# Patient Record
Sex: MALE | Race: BLACK OR AFRICAN AMERICAN | NOT HISPANIC OR LATINO | URBAN - METROPOLITAN AREA
[De-identification: names, ages, dates, MRNs, and addresses within clinical notes are randomized per-mention and may not be internally consistent; named-entity substitution may affect disease eponyms.]

---

## 2019-11-14 ENCOUNTER — EMERGENCY (EMERGENCY)
Facility: HOSPITAL | Age: 29
LOS: 1 days | Discharge: LEFT BEFORE DISCHARGE | End: 2019-11-14

## 2019-11-14 VITALS
SYSTOLIC BLOOD PRESSURE: 115 MMHG | HEIGHT: 71 IN | DIASTOLIC BLOOD PRESSURE: 79 MMHG | OXYGEN SATURATION: 97 % | TEMPERATURE: 99 F | HEART RATE: 74 BPM | RESPIRATION RATE: 17 BRPM | WEIGHT: 276.02 LBS

## 2019-11-14 NOTE — ED ADULT NURSE REASSESSMENT NOTE - NS ED NURSE REASSESS COMMENT FT1
pt says he is leaving and does not want to stay,explained to pt that we will have him seen by MD but pt states he does not want to stay

## 2019-11-14 NOTE — ED ADULT TRIAGE NOTE - CHIEF COMPLAINT QUOTE
"I am have chest discomfort and cough, with body aches. I took the Fast Acting Mucinex and twice and it gave me a blood nose. "  Pt A & O x4.

## 2021-10-21 ENCOUNTER — INPATIENT (INPATIENT)
Facility: HOSPITAL | Age: 31
LOS: 2 days | Discharge: HOME | DRG: 247 | End: 2021-10-24
Attending: FAMILY MEDICINE | Admitting: INTERNAL MEDICINE
Payer: COMMERCIAL

## 2021-10-21 VITALS
DIASTOLIC BLOOD PRESSURE: 52 MMHG | OXYGEN SATURATION: 95 % | SYSTOLIC BLOOD PRESSURE: 99 MMHG | HEIGHT: 71 IN | HEART RATE: 91 BPM | RESPIRATION RATE: 26 BRPM

## 2021-10-21 DIAGNOSIS — I21.3 ST ELEVATION (STEMI) MYOCARDIAL INFARCTION OF UNSPECIFIED SITE: ICD-10-CM

## 2021-10-21 LAB
ALBUMIN SERPL ELPH-MCNC: 4.4 G/DL — SIGNIFICANT CHANGE UP (ref 3.3–5.2)
ALP SERPL-CCNC: 56 U/L — SIGNIFICANT CHANGE UP (ref 40–120)
ALT FLD-CCNC: 41 U/L — HIGH
ANION GAP SERPL CALC-SCNC: 20 MMOL/L — HIGH (ref 5–17)
AST SERPL-CCNC: 25 U/L — SIGNIFICANT CHANGE UP
BASOPHILS # BLD AUTO: 0.05 K/UL — SIGNIFICANT CHANGE UP (ref 0–0.2)
BASOPHILS NFR BLD AUTO: 0.5 % — SIGNIFICANT CHANGE UP (ref 0–2)
BILIRUB SERPL-MCNC: 0.2 MG/DL — LOW (ref 0.4–2)
BUN SERPL-MCNC: 11.4 MG/DL — SIGNIFICANT CHANGE UP (ref 8–20)
CALCIUM SERPL-MCNC: 9.3 MG/DL — SIGNIFICANT CHANGE UP (ref 8.6–10.2)
CHLORIDE SERPL-SCNC: 102 MMOL/L — SIGNIFICANT CHANGE UP (ref 98–107)
CO2 SERPL-SCNC: 19 MMOL/L — LOW (ref 22–29)
CREAT SERPL-MCNC: 1.16 MG/DL — SIGNIFICANT CHANGE UP (ref 0.5–1.3)
EOSINOPHIL # BLD AUTO: 0.08 K/UL — SIGNIFICANT CHANGE UP (ref 0–0.5)
EOSINOPHIL NFR BLD AUTO: 0.7 % — SIGNIFICANT CHANGE UP (ref 0–6)
ETHANOL SERPL-MCNC: <10 MG/DL — SIGNIFICANT CHANGE UP (ref 0–9)
FLUAV AG NPH QL: SIGNIFICANT CHANGE UP
FLUBV AG NPH QL: SIGNIFICANT CHANGE UP
GLUCOSE SERPL-MCNC: 165 MG/DL — HIGH (ref 70–99)
HCT VFR BLD CALC: 40.5 % — SIGNIFICANT CHANGE UP (ref 39–50)
HGB BLD-MCNC: 13.5 G/DL — SIGNIFICANT CHANGE UP (ref 13–17)
IMM GRANULOCYTES NFR BLD AUTO: 1.7 % — HIGH (ref 0–1.5)
LIDOCAIN IGE QN: 27 U/L — SIGNIFICANT CHANGE UP (ref 22–51)
LYMPHOCYTES # BLD AUTO: 3.41 K/UL — HIGH (ref 1–3.3)
LYMPHOCYTES # BLD AUTO: 31.3 % — SIGNIFICANT CHANGE UP (ref 13–44)
MAGNESIUM SERPL-MCNC: 1.9 MG/DL — SIGNIFICANT CHANGE UP (ref 1.6–2.6)
MCHC RBC-ENTMCNC: 26.7 PG — LOW (ref 27–34)
MCHC RBC-ENTMCNC: 33.3 GM/DL — SIGNIFICANT CHANGE UP (ref 32–36)
MCV RBC AUTO: 80 FL — SIGNIFICANT CHANGE UP (ref 80–100)
MONOCYTES # BLD AUTO: 0.99 K/UL — HIGH (ref 0–0.9)
MONOCYTES NFR BLD AUTO: 9.1 % — SIGNIFICANT CHANGE UP (ref 2–14)
NEUTROPHILS # BLD AUTO: 6.17 K/UL — SIGNIFICANT CHANGE UP (ref 1.8–7.4)
NEUTROPHILS NFR BLD AUTO: 56.7 % — SIGNIFICANT CHANGE UP (ref 43–77)
NT-PROBNP SERPL-SCNC: 88 PG/ML — SIGNIFICANT CHANGE UP (ref 0–300)
PLATELET # BLD AUTO: 297 K/UL — SIGNIFICANT CHANGE UP (ref 150–400)
POTASSIUM SERPL-MCNC: 3.8 MMOL/L — SIGNIFICANT CHANGE UP (ref 3.5–5.3)
POTASSIUM SERPL-SCNC: 3.8 MMOL/L — SIGNIFICANT CHANGE UP (ref 3.5–5.3)
PROT SERPL-MCNC: 6.9 G/DL — SIGNIFICANT CHANGE UP (ref 6.6–8.7)
RBC # BLD: 5.06 M/UL — SIGNIFICANT CHANGE UP (ref 4.2–5.8)
RBC # FLD: 13.6 % — SIGNIFICANT CHANGE UP (ref 10.3–14.5)
RSV RNA NPH QL NAA+NON-PROBE: SIGNIFICANT CHANGE UP
SARS-COV-2 RNA SPEC QL NAA+PROBE: SIGNIFICANT CHANGE UP
SODIUM SERPL-SCNC: 140 MMOL/L — SIGNIFICANT CHANGE UP (ref 135–145)
TROPONIN T SERPL-MCNC: <0.01 NG/ML — SIGNIFICANT CHANGE UP (ref 0–0.06)
WBC # BLD: 10.88 K/UL — HIGH (ref 3.8–10.5)
WBC # FLD AUTO: 10.88 K/UL — HIGH (ref 3.8–10.5)

## 2021-10-21 PROCEDURE — 71045 X-RAY EXAM CHEST 1 VIEW: CPT | Mod: 26

## 2021-10-21 PROCEDURE — 93010 ELECTROCARDIOGRAM REPORT: CPT

## 2021-10-21 PROCEDURE — 99152 MOD SED SAME PHYS/QHP 5/>YRS: CPT | Mod: 59

## 2021-10-21 PROCEDURE — 93458 L HRT ARTERY/VENTRICLE ANGIO: CPT | Mod: 26,59

## 2021-10-21 PROCEDURE — 92978 ENDOLUMINL IVUS OCT C 1ST: CPT | Mod: 26,LD

## 2021-10-21 PROCEDURE — 99291 CRITICAL CARE FIRST HOUR: CPT

## 2021-10-21 PROCEDURE — 92941 PRQ TRLML REVSC TOT OCCL AMI: CPT | Mod: LD

## 2021-10-21 RX ORDER — SODIUM CHLORIDE 9 MG/ML
1000 INJECTION INTRAMUSCULAR; INTRAVENOUS; SUBCUTANEOUS ONCE
Refills: 0 | Status: COMPLETED | OUTPATIENT
Start: 2021-10-21 | End: 2021-10-21

## 2021-10-21 RX ORDER — ASPIRIN/CALCIUM CARB/MAGNESIUM 324 MG
324 TABLET ORAL ONCE
Refills: 0 | Status: COMPLETED | OUTPATIENT
Start: 2021-10-21 | End: 2021-10-21

## 2021-10-21 RX ADMIN — Medication 324 MILLIGRAM(S): at 23:15

## 2021-10-21 RX ADMIN — SODIUM CHLORIDE 1000 MILLILITER(S): 9 INJECTION INTRAMUSCULAR; INTRAVENOUS; SUBCUTANEOUS at 23:15
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
## 2021-10-21 NOTE — ED PROVIDER NOTE - CRITICAL CARE ATTENDING CONTRIBUTION TO CARE
I have personally provided 30 minutes of critical care time exclusive of time spent on separately billable procedures. Time includes review of laboratory data, radiology results, discussion with consultants, and monitoring for potential decompensation. Interventions were performed as documented above
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
## 2021-10-22 LAB
A1C WITH ESTIMATED AVERAGE GLUCOSE RESULT: 6.3 % — HIGH (ref 4–5.6)
ABO RH CONFIRMATION: SIGNIFICANT CHANGE UP
ALBUMIN SERPL ELPH-MCNC: 4.6 G/DL — SIGNIFICANT CHANGE UP (ref 3.3–5.2)
ALP SERPL-CCNC: 59 U/L — SIGNIFICANT CHANGE UP (ref 40–120)
ALT FLD-CCNC: 46 U/L — HIGH
AMPHET UR-MCNC: NEGATIVE — SIGNIFICANT CHANGE UP
ANION GAP SERPL CALC-SCNC: 18 MMOL/L — HIGH (ref 5–17)
APPEARANCE UR: CLEAR — SIGNIFICANT CHANGE UP
AST SERPL-CCNC: 57 U/L — HIGH
BACTERIA # UR AUTO: ABNORMAL
BARBITURATES UR SCN-MCNC: NEGATIVE — SIGNIFICANT CHANGE UP
BENZODIAZ UR-MCNC: NEGATIVE — SIGNIFICANT CHANGE UP
BILIRUB SERPL-MCNC: 0.5 MG/DL — SIGNIFICANT CHANGE UP (ref 0.4–2)
BILIRUB UR-MCNC: NEGATIVE — SIGNIFICANT CHANGE UP
BLD GP AB SCN SERPL QL: SIGNIFICANT CHANGE UP
BUN SERPL-MCNC: 9.7 MG/DL — SIGNIFICANT CHANGE UP (ref 8–20)
CALCIUM SERPL-MCNC: 8.7 MG/DL — SIGNIFICANT CHANGE UP (ref 8.6–10.2)
CHLORIDE SERPL-SCNC: 98 MMOL/L — SIGNIFICANT CHANGE UP (ref 98–107)
CHOLEST SERPL-MCNC: 181 MG/DL — SIGNIFICANT CHANGE UP
CO2 SERPL-SCNC: 22 MMOL/L — SIGNIFICANT CHANGE UP (ref 22–29)
COCAINE METAB.OTHER UR-MCNC: NEGATIVE — SIGNIFICANT CHANGE UP
COLOR SPEC: YELLOW — SIGNIFICANT CHANGE UP
CREAT SERPL-MCNC: 0.89 MG/DL — SIGNIFICANT CHANGE UP (ref 0.5–1.3)
DIFF PNL FLD: ABNORMAL
EPI CELLS # UR: SIGNIFICANT CHANGE UP
ESTIMATED AVERAGE GLUCOSE: 134 MG/DL — HIGH (ref 68–114)
GLUCOSE SERPL-MCNC: 129 MG/DL — HIGH (ref 70–99)
GLUCOSE UR QL: NEGATIVE MG/DL — SIGNIFICANT CHANGE UP
HCT VFR BLD CALC: 41.5 % — SIGNIFICANT CHANGE UP (ref 39–50)
HDLC SERPL-MCNC: 43 MG/DL — SIGNIFICANT CHANGE UP
HGB BLD-MCNC: 13.7 G/DL — SIGNIFICANT CHANGE UP (ref 13–17)
HYALINE CASTS # UR AUTO: ABNORMAL /LPF
KETONES UR-MCNC: NEGATIVE — SIGNIFICANT CHANGE UP
LEUKOCYTE ESTERASE UR-ACNC: NEGATIVE — SIGNIFICANT CHANGE UP
LIPID PNL WITH DIRECT LDL SERPL: 106 MG/DL — HIGH
MCHC RBC-ENTMCNC: 26.7 PG — LOW (ref 27–34)
MCHC RBC-ENTMCNC: 33 GM/DL — SIGNIFICANT CHANGE UP (ref 32–36)
MCV RBC AUTO: 80.9 FL — SIGNIFICANT CHANGE UP (ref 80–100)
METHADONE UR-MCNC: NEGATIVE — SIGNIFICANT CHANGE UP
NITRITE UR-MCNC: NEGATIVE — SIGNIFICANT CHANGE UP
NON HDL CHOLESTEROL: 138 MG/DL — HIGH
OPIATES UR-MCNC: NEGATIVE — SIGNIFICANT CHANGE UP
PCP SPEC-MCNC: SIGNIFICANT CHANGE UP
PCP UR-MCNC: NEGATIVE — SIGNIFICANT CHANGE UP
PH UR: 6 — SIGNIFICANT CHANGE UP (ref 5–8)
PLATELET # BLD AUTO: 274 K/UL — SIGNIFICANT CHANGE UP (ref 150–400)
POTASSIUM SERPL-MCNC: 3.9 MMOL/L — SIGNIFICANT CHANGE UP (ref 3.5–5.3)
POTASSIUM SERPL-SCNC: 3.9 MMOL/L — SIGNIFICANT CHANGE UP (ref 3.5–5.3)
PROT SERPL-MCNC: 7.1 G/DL — SIGNIFICANT CHANGE UP (ref 6.6–8.7)
PROT UR-MCNC: 30 MG/DL
RBC # BLD: 5.13 M/UL — SIGNIFICANT CHANGE UP (ref 4.2–5.8)
RBC # FLD: 13.6 % — SIGNIFICANT CHANGE UP (ref 10.3–14.5)
RBC CASTS # UR COMP ASSIST: ABNORMAL /HPF (ref 0–4)
SODIUM SERPL-SCNC: 138 MMOL/L — SIGNIFICANT CHANGE UP (ref 135–145)
SP GR SPEC: 1.02 — SIGNIFICANT CHANGE UP (ref 1.01–1.02)
THC UR QL: POSITIVE
TRIGL SERPL-MCNC: 162 MG/DL — HIGH
TROPONIN T SERPL-MCNC: 0.37 NG/ML — HIGH (ref 0–0.06)
TSH SERPL-MCNC: 1.11 UIU/ML — SIGNIFICANT CHANGE UP (ref 0.27–4.2)
UROBILINOGEN FLD QL: NEGATIVE MG/DL — SIGNIFICANT CHANGE UP
WBC # BLD: 16.92 K/UL — HIGH (ref 3.8–10.5)
WBC # FLD AUTO: 16.92 K/UL — HIGH (ref 3.8–10.5)
WBC UR QL: SIGNIFICANT CHANGE UP

## 2021-10-22 PROCEDURE — 99233 SBSQ HOSP IP/OBS HIGH 50: CPT

## 2021-10-22 PROCEDURE — 99223 1ST HOSP IP/OBS HIGH 75: CPT

## 2021-10-22 PROCEDURE — 93010 ELECTROCARDIOGRAM REPORT: CPT

## 2021-10-22 RX ORDER — TICAGRELOR 90 MG/1
90 TABLET ORAL EVERY 12 HOURS
Refills: 0 | Status: DISCONTINUED | OUTPATIENT
Start: 2021-10-22 | End: 2021-10-24

## 2021-10-22 RX ORDER — ERGOCALCIFEROL 1.25 MG/1
1 CAPSULE ORAL
Qty: 0 | Refills: 0 | DISCHARGE

## 2021-10-22 RX ORDER — CHLORHEXIDINE GLUCONATE 213 G/1000ML
1 SOLUTION TOPICAL
Refills: 0 | Status: DISCONTINUED | OUTPATIENT
Start: 2021-10-22 | End: 2021-10-24

## 2021-10-22 RX ORDER — LISINOPRIL 2.5 MG/1
2.5 TABLET ORAL DAILY
Refills: 0 | Status: DISCONTINUED | OUTPATIENT
Start: 2021-10-22 | End: 2021-10-24

## 2021-10-22 RX ORDER — METOPROLOL TARTRATE 50 MG
12.5 TABLET ORAL
Refills: 0 | Status: DISCONTINUED | OUTPATIENT
Start: 2021-10-22 | End: 2021-10-23

## 2021-10-22 RX ORDER — EPTIFIBATIDE 2 MG/ML
2 INJECTION, SOLUTION INTRAVENOUS
Qty: 75 | Refills: 0 | Status: DISCONTINUED | OUTPATIENT
Start: 2021-10-22 | End: 2021-10-22

## 2021-10-22 RX ORDER — CLOPIDOGREL BISULFATE 75 MG/1
75 TABLET, FILM COATED ORAL DAILY
Refills: 0 | Status: DISCONTINUED | OUTPATIENT
Start: 2021-10-22 | End: 2021-10-22

## 2021-10-22 RX ORDER — ASPIRIN/CALCIUM CARB/MAGNESIUM 324 MG
81 TABLET ORAL DAILY
Refills: 0 | Status: DISCONTINUED | OUTPATIENT
Start: 2021-10-22 | End: 2021-10-24

## 2021-10-22 RX ORDER — INFLUENZA VIRUS VACCINE 15; 15; 15; 15 UG/.5ML; UG/.5ML; UG/.5ML; UG/.5ML
0.5 SUSPENSION INTRAMUSCULAR ONCE
Refills: 0 | Status: DISCONTINUED | OUTPATIENT
Start: 2021-10-22 | End: 2021-10-24

## 2021-10-22 RX ORDER — LANOLIN ALCOHOL/MO/W.PET/CERES
3 CREAM (GRAM) TOPICAL AT BEDTIME
Refills: 0 | Status: DISCONTINUED | OUTPATIENT
Start: 2021-10-22 | End: 2021-10-24

## 2021-10-22 RX ORDER — ASPIRIN/CALCIUM CARB/MAGNESIUM 324 MG
325 TABLET ORAL DAILY
Refills: 0 | Status: DISCONTINUED | OUTPATIENT
Start: 2021-10-22 | End: 2021-10-22

## 2021-10-22 RX ORDER — ONDANSETRON 8 MG/1
4 TABLET, FILM COATED ORAL EVERY 6 HOURS
Refills: 0 | Status: DISCONTINUED | OUTPATIENT
Start: 2021-10-22 | End: 2021-10-24

## 2021-10-22 RX ORDER — ATORVASTATIN CALCIUM 80 MG/1
40 TABLET, FILM COATED ORAL AT BEDTIME
Refills: 0 | Status: DISCONTINUED | OUTPATIENT
Start: 2021-10-22 | End: 2021-10-24

## 2021-10-22 RX ORDER — ACETAMINOPHEN 500 MG
650 TABLET ORAL EVERY 6 HOURS
Refills: 0 | Status: DISCONTINUED | OUTPATIENT
Start: 2021-10-22 | End: 2021-10-24

## 2021-10-22 RX ORDER — ENOXAPARIN SODIUM 100 MG/ML
40 INJECTION SUBCUTANEOUS DAILY
Refills: 0 | Status: DISCONTINUED | OUTPATIENT
Start: 2021-10-22 | End: 2021-10-24

## 2021-10-22 RX ADMIN — TICAGRELOR 90 MILLIGRAM(S): 90 TABLET ORAL at 06:49

## 2021-10-22 RX ADMIN — LISINOPRIL 2.5 MILLIGRAM(S): 2.5 TABLET ORAL at 06:49

## 2021-10-22 RX ADMIN — Medication 81 MILLIGRAM(S): at 11:36

## 2021-10-22 RX ADMIN — TICAGRELOR 90 MILLIGRAM(S): 90 TABLET ORAL at 17:29

## 2021-10-22 RX ADMIN — Medication 3 MILLIGRAM(S): at 02:38

## 2021-10-22 RX ADMIN — EPTIFIBATIDE 20.3 MICROGRAM(S)/KG/MIN: 2 INJECTION, SOLUTION INTRAVENOUS at 09:47

## 2021-10-22 RX ADMIN — Medication 12.5 MILLIGRAM(S): at 17:29

## 2021-10-22 RX ADMIN — ATORVASTATIN CALCIUM 40 MILLIGRAM(S): 80 TABLET, FILM COATED ORAL at 22:46

## 2021-10-22 RX ADMIN — Medication 12.5 MILLIGRAM(S): at 06:49

## 2021-10-22 RX ADMIN — EPTIFIBATIDE 20.3 MICROGRAM(S)/KG/MIN: 2 INJECTION, SOLUTION INTRAVENOUS at 01:39

## 2021-10-22 RX ADMIN — ENOXAPARIN SODIUM 40 MILLIGRAM(S): 100 INJECTION SUBCUTANEOUS at 22:46

## 2021-10-22 RX ADMIN — CHLORHEXIDINE GLUCONATE 1 APPLICATION(S): 213 SOLUTION TOPICAL at 06:49

## 2021-10-22 NOTE — PATIENT PROFILE ADULT - NSTOBACCO QUIT READY_GEN_A_CORE_SD
GENERAL: well appearing, no acute distress   HEAD: atraumatic   EYES: EOMI, pink conjunctiva   ENT: moist oral mucosa   CARDIAC: RRR, no edema, distal pulses present   RESPIRATORY: lungs CTAB, no increased work of breathing   GASTROINTESTINAL: no abdominal tenderness, no rebound or guarding, bowel sounds presents  GENITOURINARY: no CVA tenderness   MUSCULOSKELETAL: no deformity   NEUROLOGICAL: AAOx3, CN's II-XII intact, strength 5/5 bilateral UE and LE, sensation intact to light touch, finger to nose intact, steady gait  SKIN: intact   PSYCHIATRIC: cooperative  HEME LYMPH: no lymphadenopathy
ready to quit
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
## 2021-10-22 NOTE — H&P CARDIOLOGY - EKG AND INTERPRETATION
INTERPRETATION OF TELEMETRY: Sinus no ectopy  ECG: Sinus at 85bpm w/ ST elevation Anteroseptal leads, Distal % stenosis

## 2021-10-22 NOTE — PHARMACOTHERAPY INTERVENTION NOTE - COMMENTS
drug interaction between doses of aspirin > 100 mg and ticagrelor; decreases efficacy of ticagrelor; decrease the full dose of aspirin to 81 mg
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
## 2021-10-23 LAB
ALBUMIN SERPL ELPH-MCNC: 4.2 G/DL — SIGNIFICANT CHANGE UP (ref 3.3–5.2)
ALP SERPL-CCNC: 52 U/L — SIGNIFICANT CHANGE UP (ref 40–120)
ALT FLD-CCNC: 57 U/L — HIGH
ANION GAP SERPL CALC-SCNC: 14 MMOL/L — SIGNIFICANT CHANGE UP (ref 5–17)
AST SERPL-CCNC: 158 U/L — HIGH
BASOPHILS # BLD AUTO: 0.02 K/UL — SIGNIFICANT CHANGE UP (ref 0–0.2)
BASOPHILS NFR BLD AUTO: 0.2 % — SIGNIFICANT CHANGE UP (ref 0–2)
BILIRUB SERPL-MCNC: 0.8 MG/DL — SIGNIFICANT CHANGE UP (ref 0.4–2)
BUN SERPL-MCNC: 10.3 MG/DL — SIGNIFICANT CHANGE UP (ref 8–20)
CALCIUM SERPL-MCNC: 8.5 MG/DL — LOW (ref 8.6–10.2)
CHLORIDE SERPL-SCNC: 99 MMOL/L — SIGNIFICANT CHANGE UP (ref 98–107)
CO2 SERPL-SCNC: 24 MMOL/L — SIGNIFICANT CHANGE UP (ref 22–29)
CREAT SERPL-MCNC: 1.01 MG/DL — SIGNIFICANT CHANGE UP (ref 0.5–1.3)
EOSINOPHIL # BLD AUTO: 0.05 K/UL — SIGNIFICANT CHANGE UP (ref 0–0.5)
EOSINOPHIL NFR BLD AUTO: 0.4 % — SIGNIFICANT CHANGE UP (ref 0–6)
GLUCOSE SERPL-MCNC: 150 MG/DL — HIGH (ref 70–99)
HCT VFR BLD CALC: 40.3 % — SIGNIFICANT CHANGE UP (ref 39–50)
HGB BLD-MCNC: 12.8 G/DL — LOW (ref 13–17)
IMM GRANULOCYTES NFR BLD AUTO: 0.6 % — SIGNIFICANT CHANGE UP (ref 0–1.5)
LYMPHOCYTES # BLD AUTO: 29.1 % — SIGNIFICANT CHANGE UP (ref 13–44)
LYMPHOCYTES # BLD AUTO: 3.28 K/UL — SIGNIFICANT CHANGE UP (ref 1–3.3)
MAGNESIUM SERPL-MCNC: 2 MG/DL — SIGNIFICANT CHANGE UP (ref 1.6–2.6)
MCHC RBC-ENTMCNC: 25.7 PG — LOW (ref 27–34)
MCHC RBC-ENTMCNC: 31.8 GM/DL — LOW (ref 32–36)
MCV RBC AUTO: 80.8 FL — SIGNIFICANT CHANGE UP (ref 80–100)
MONOCYTES # BLD AUTO: 1.01 K/UL — HIGH (ref 0–0.9)
MONOCYTES NFR BLD AUTO: 9 % — SIGNIFICANT CHANGE UP (ref 2–14)
NEUTROPHILS # BLD AUTO: 6.84 K/UL — SIGNIFICANT CHANGE UP (ref 1.8–7.4)
NEUTROPHILS NFR BLD AUTO: 60.7 % — SIGNIFICANT CHANGE UP (ref 43–77)
PLATELET # BLD AUTO: 255 K/UL — SIGNIFICANT CHANGE UP (ref 150–400)
POTASSIUM SERPL-MCNC: 3.6 MMOL/L — SIGNIFICANT CHANGE UP (ref 3.5–5.3)
POTASSIUM SERPL-SCNC: 3.6 MMOL/L — SIGNIFICANT CHANGE UP (ref 3.5–5.3)
PROT SERPL-MCNC: 6.2 G/DL — LOW (ref 6.6–8.7)
RBC # BLD: 4.99 M/UL — SIGNIFICANT CHANGE UP (ref 4.2–5.8)
RBC # FLD: 13.9 % — SIGNIFICANT CHANGE UP (ref 10.3–14.5)
SODIUM SERPL-SCNC: 137 MMOL/L — SIGNIFICANT CHANGE UP (ref 135–145)
WBC # BLD: 11.27 K/UL — HIGH (ref 3.8–10.5)
WBC # FLD AUTO: 11.27 K/UL — HIGH (ref 3.8–10.5)

## 2021-10-23 PROCEDURE — 93010 ELECTROCARDIOGRAM REPORT: CPT

## 2021-10-23 PROCEDURE — 99233 SBSQ HOSP IP/OBS HIGH 50: CPT

## 2021-10-23 PROCEDURE — 99232 SBSQ HOSP IP/OBS MODERATE 35: CPT

## 2021-10-23 RX ORDER — TICAGRELOR 90 MG/1
90 TABLET ORAL
Qty: 5400 | Refills: 3
Start: 2021-10-23 | End: 2022-02-19

## 2021-10-23 RX ORDER — POTASSIUM CHLORIDE 20 MEQ
40 PACKET (EA) ORAL ONCE
Refills: 0 | Status: COMPLETED | OUTPATIENT
Start: 2021-10-23 | End: 2021-10-23

## 2021-10-23 RX ORDER — METOPROLOL TARTRATE 50 MG
50 TABLET ORAL DAILY
Refills: 0 | Status: DISCONTINUED | OUTPATIENT
Start: 2021-10-23 | End: 2021-10-24

## 2021-10-23 RX ADMIN — ENOXAPARIN SODIUM 40 MILLIGRAM(S): 100 INJECTION SUBCUTANEOUS at 12:04

## 2021-10-23 RX ADMIN — Medication 1 TABLET(S): at 17:56

## 2021-10-23 RX ADMIN — Medication 40 MILLIEQUIVALENT(S): at 12:03

## 2021-10-23 RX ADMIN — TICAGRELOR 90 MILLIGRAM(S): 90 TABLET ORAL at 05:22

## 2021-10-23 RX ADMIN — TICAGRELOR 90 MILLIGRAM(S): 90 TABLET ORAL at 17:56

## 2021-10-23 RX ADMIN — LISINOPRIL 2.5 MILLIGRAM(S): 2.5 TABLET ORAL at 05:22

## 2021-10-23 RX ADMIN — Medication 81 MILLIGRAM(S): at 12:03

## 2021-10-23 RX ADMIN — ATORVASTATIN CALCIUM 40 MILLIGRAM(S): 80 TABLET, FILM COATED ORAL at 22:03

## 2021-10-23 RX ADMIN — Medication 12.5 MILLIGRAM(S): at 05:22

## 2021-10-23 RX ADMIN — Medication 50 MILLIGRAM(S): at 17:56

## 2021-10-23 NOTE — DIETITIAN INITIAL EVALUATION ADULT. - ORAL INTAKE PTA/DIET HISTORY
Pt reports good po intake at meals.  Pt states good po intake prior to admission and denies wt loss.  Pt states not following DASH/TLC meal plan at home.  Briefly discussed meal plan, however pt appeared uninterested at this time.  RD to remain available.
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
## 2021-10-23 NOTE — PROGRESS NOTE ADULT - SUBJECTIVE AND OBJECTIVE BOX
[facility]  [address] [place] [id number]    SUBJECTIVE / OVERNIGHT EVENTS:  Feeling ok no chest discomfort      ROS:  CARDIAC: No cp sob or palp  RESP: No mucus production no uri symptoms  GI:  No melana no abd pain  EXTREMITIES:  no calf tenderness no edema    TELEMETRY:      MEDICATIONS  (STANDING):  aspirin enteric coated 81 milliGRAM(s) Oral daily  atorvastatin 40 milliGRAM(s) Oral at bedtime  chlorhexidine 4% Liquid 1 Application(s) Topical <User Schedule>  enoxaparin Injectable 40 milliGRAM(s) SubCutaneous daily  influenza   Vaccine 0.5 milliLiter(s) IntraMuscular once  lisinopril 2.5 milliGRAM(s) Oral daily  metoprolol tartrate 12.5 milliGRAM(s) Oral two times a day  ticagrelor 90 milliGRAM(s) Oral every 12 hours    MEDICATIONS  (PRN):  acetaminophen     Tablet .. 650 milliGRAM(s) Oral every 6 hours PRN Temp greater or equal to 38C (100.4F), Mild Pain (1 - 3), Moderate Pain (4 - 6)  melatonin 3 milliGRAM(s) Oral at bedtime PRN Sleep  ondansetron Injectable 4 milliGRAM(s) IV Push every 6 hours PRN Nausea and/or Vomiting    Vital Signs Last 24 Hrs  T(C): 37.1 (23 Oct 2021 09:48), Max: 37.6 (23 Oct 2021 05:00)  T(F): 98.8 (23 Oct 2021 09:48), Max: 99.7 (23 Oct 2021 05:00)  HR: 80 (23 Oct 2021 09:48) (71 - 95)  BP: 133/77 (23 Oct 2021 09:48) (96/61 - 133/77)  BP(mean): 80 (22 Oct 2021 18:40) (71 - 86)  RR: 18 (23 Oct 2021 09:48) (5 - 21)  SpO2: 98% (23 Oct 2021 09:48) (97% - 100%)  I&O's Summary    22 Oct 2021 07:01  -  23 Oct 2021 07:00  --------------------------------------------------------  IN: 870 mL / OUT: 1450 mL / NET: -580 mL        PHYSICAL EXAM:  GENERAL: NAD, well-developed  EYES: EOMI, PERRLA, conjunctiva and sclera clear  NECK:  No JVD  CHEST/LUNG: CTABL ; No wheeze  HEART: RRR; No murmurs  ABDOMEN: Soft, Nontender, Nondistended; Bowel sounds present  EXTREMITIES:  2+ Peripheral Pulses, No edema right wrist with good pulse and good capillary refill  PSYCH: AAOx3  NEUROLOGY: non-focal  SKIN: No rashes or lesions. No sacral ulcer    LABS:                        12.8   11.27 )-----------( 255      ( 23 Oct 2021 07:23 )             40.3     10-    137  |  99  |  10.3  ----------------------------<  150<H>  3.6   |  24.0  |  1.01    Ca    8.5<L>      23 Oct 2021 07:23  Mg     2.0     10-    TPro  6.2<L>  /  Alb  4.2  /  TBili  0.8  /  DBili  x   /  AST  158<H>  /  ALT  57<H>  /  AlkPhos  52  10-23    CARDIAC MARKERS ( 22 Oct 2021 04:22 )  x     / 0.37 ng/mL / x     / x     / x      CARDIAC MARKERS ( 21 Oct 2021 23:18 )  x     / <0.01 ng/mL / x     / x     / x          Urinalysis Basic - ( 22 Oct 2021 00:15 )    Color: Yellow / Appearance: Clear / S.020 / pH: x  Gluc: x / Ketone: Negative  / Bili: Negative / Urobili: Negative mg/dL   Blood: x / Protein: 30 mg/dL / Nitrite: Negative   Leuk Esterase: Negative / RBC: 3-5 /HPF / WBC 0-2   Sq Epi: x / Non Sq Epi: Occasional / Bacteria: Occasional        Microbiology;        RADIOLOGY & ADDITIONAL TESTS:                     [facility]  [address] [place] [address]    SUBJECTIVE / OVERNIGHT EVENTS:  Feeling ok no chest discomfort  EKG NSR  RAD prwp ant wall with st changes evolution of AWMI  Telemetry NSR wnl    ROS:  CARDIAC: No cp sob or palp  RESP: No mucus production no uri symptoms  GI:  No melana no abd pain  EXTREMITIES:  no calf tenderness no edema    MEDICATIONS  (STANDING):  aspirin enteric coated 81 milliGRAM(s) Oral daily  atorvastatin 40 milliGRAM(s) Oral at bedtime  chlorhexidine 4% Liquid 1 Application(s) Topical <User Schedule>  enoxaparin Injectable 40 milliGRAM(s) SubCutaneous daily  influenza   Vaccine 0.5 milliLiter(s) IntraMuscular once  lisinopril 2.5 milliGRAM(s) Oral daily  metoprolol tartrate 12.5 milliGRAM(s) Oral two times a day  ticagrelor 90 milliGRAM(s) Oral every 12 hours    MEDICATIONS  (PRN):  acetaminophen     Tablet .. 650 milliGRAM(s) Oral every 6 hours PRN Temp greater or equal to 38C (100.4F), Mild Pain (1 - 3), Moderate Pain (4 - 6)  melatonin 3 milliGRAM(s) Oral at bedtime PRN Sleep  ondansetron Injectable 4 milliGRAM(s) IV Push every 6 hours PRN Nausea and/or Vomiting    Vital Signs Last 24 Hrs  T(C): 37.1 (23 Oct 2021 09:48), Max: 37.6 (23 Oct 2021 05:00)  T(F): 98.8 (23 Oct 2021 09:48), Max: 99.7 (23 Oct 2021 05:00)  HR: 80 (23 Oct 2021 09:48) (71 - 95)  BP: 133/77 (23 Oct 2021 09:48) (96/61 - 133/77)  BP(mean): 80 (22 Oct 2021 18:40) (71 - 86)  RR: 18 (23 Oct 2021 09:48) (5 - 21)  SpO2: 98% (23 Oct 2021 09:48) (97% - 100%)  I&O's Summary    22 Oct 2021 07:01  -  23 Oct 2021 07:00  --------------------------------------------------------  IN: 870 mL / OUT: 1450 mL / NET: -580 mL        PHYSICAL EXAM:  GENERAL: NAD, well-developed  EYES: EOMI, PERRLA, conjunctiva and sclera clear  NECK:  No JVD  CHEST/LUNG: CTABL ; No wheeze  HEART: RRR; No murmurs  ABDOMEN: Soft, Nontender, Nondistended; Bowel sounds present  EXTREMITIES:  2+ Peripheral Pulses, No edema right wrist with good pulse and good capillary refill  PSYCH: AAOx3  NEUROLOGY: non-focal  SKIN: No rashes or lesions. No sacral ulcer    LABS:                        12.8   11.27 )-----------( 255      ( 23 Oct 2021 07:23 )             40.3     10-23    137  |  99  |  10.3  ----------------------------<  150<H>  3.6   |  24.0  |  1.01    Ca    8.5<L>      23 Oct 2021 07:23  Mg     2.0     10-    TPro  6.2<L>  /  Alb  4.2  /  TBili  0.8  /  DBili  x   /  AST  158<H>  /  ALT  57<H>  /  AlkPhos  52  10-23    CARDIAC MARKERS ( 22 Oct 2021 04:22 )  x     / 0.37 ng/mL / x     / x     / x      CARDIAC MARKERS ( 21 Oct 2021 23:18 )  x     / <0.01 ng/mL / x     / x     / x          Urinalysis Basic - ( 22 Oct 2021 00:15 )    Color: Yellow / Appearance: Clear / S.020 / pH: x  Gluc: x / Ketone: Negative  / Bili: Negative / Urobili: Negative mg/dL   Blood: x / Protein: 30 mg/dL / Nitrite: Negative   Leuk Esterase: Negative / RBC: 3-5 /HPF / WBC 0-2   Sq Epi: x / Non Sq Epi: Occasional / Bacteria: Occasional        Microbiology;        RADIOLOGY & ADDITIONAL TESTS:
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
## 2021-10-23 NOTE — DIETITIAN INITIAL EVALUATION ADULT. - PERTINENT LABORATORY DATA
10-23 Na137 mmol/L Glu 150 mg/dL<H> K+ 3.6 mmol/L Cr  1.01 mg/dL BUN 10.3 mg/dL Phos n/a   Alb 4.2 g/dL PAB n/a

## 2021-10-23 NOTE — DIETITIAN INITIAL EVALUATION ADULT. - CALCULATED FROM (ML/KG)
Called patient to follow-up on how he was doing post operative. Patient stated he was doing well - no pain, getting his medications in. Reminded patient to wear the eye shield at bedtime and confirmed his appointment tomorrow with Dr. Almeida. Answered all questions.      
1952
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
## 2021-10-24 ENCOUNTER — TRANSCRIPTION ENCOUNTER (OUTPATIENT)
Age: 31
End: 2021-10-24

## 2021-10-24 VITALS
DIASTOLIC BLOOD PRESSURE: 58 MMHG | OXYGEN SATURATION: 90 % | HEART RATE: 72 BPM | TEMPERATURE: 98 F | SYSTOLIC BLOOD PRESSURE: 110 MMHG | RESPIRATION RATE: 14 BRPM

## 2021-10-24 LAB
ALBUMIN SERPL ELPH-MCNC: 4.1 G/DL — SIGNIFICANT CHANGE UP (ref 3.3–5.2)
ALP SERPL-CCNC: 53 U/L — SIGNIFICANT CHANGE UP (ref 40–120)
ALT FLD-CCNC: 47 U/L — HIGH
ANION GAP SERPL CALC-SCNC: 14 MMOL/L — SIGNIFICANT CHANGE UP (ref 5–17)
AST SERPL-CCNC: 71 U/L — HIGH
BASOPHILS # BLD AUTO: 0.02 K/UL — SIGNIFICANT CHANGE UP (ref 0–0.2)
BASOPHILS NFR BLD AUTO: 0.2 % — SIGNIFICANT CHANGE UP (ref 0–2)
BILIRUB SERPL-MCNC: 0.3 MG/DL — LOW (ref 0.4–2)
BUN SERPL-MCNC: 12.5 MG/DL — SIGNIFICANT CHANGE UP (ref 8–20)
CALCIUM SERPL-MCNC: 8.7 MG/DL — SIGNIFICANT CHANGE UP (ref 8.6–10.2)
CHLORIDE SERPL-SCNC: 103 MMOL/L — SIGNIFICANT CHANGE UP (ref 98–107)
CO2 SERPL-SCNC: 22 MMOL/L — SIGNIFICANT CHANGE UP (ref 22–29)
CREAT SERPL-MCNC: 1.04 MG/DL — SIGNIFICANT CHANGE UP (ref 0.5–1.3)
EOSINOPHIL # BLD AUTO: 0.1 K/UL — SIGNIFICANT CHANGE UP (ref 0–0.5)
EOSINOPHIL NFR BLD AUTO: 1 % — SIGNIFICANT CHANGE UP (ref 0–6)
GLUCOSE SERPL-MCNC: 100 MG/DL — HIGH (ref 70–99)
HCT VFR BLD CALC: 41 % — SIGNIFICANT CHANGE UP (ref 39–50)
HGB BLD-MCNC: 13.1 G/DL — SIGNIFICANT CHANGE UP (ref 13–17)
IMM GRANULOCYTES NFR BLD AUTO: 0.7 % — SIGNIFICANT CHANGE UP (ref 0–1.5)
LYMPHOCYTES # BLD AUTO: 3.36 K/UL — HIGH (ref 1–3.3)
LYMPHOCYTES # BLD AUTO: 33.9 % — SIGNIFICANT CHANGE UP (ref 13–44)
MCHC RBC-ENTMCNC: 26.3 PG — LOW (ref 27–34)
MCHC RBC-ENTMCNC: 32 GM/DL — SIGNIFICANT CHANGE UP (ref 32–36)
MCV RBC AUTO: 82.2 FL — SIGNIFICANT CHANGE UP (ref 80–100)
MONOCYTES # BLD AUTO: 1.12 K/UL — HIGH (ref 0–0.9)
MONOCYTES NFR BLD AUTO: 11.3 % — SIGNIFICANT CHANGE UP (ref 2–14)
NEUTROPHILS # BLD AUTO: 5.25 K/UL — SIGNIFICANT CHANGE UP (ref 1.8–7.4)
NEUTROPHILS NFR BLD AUTO: 52.9 % — SIGNIFICANT CHANGE UP (ref 43–77)
PLATELET # BLD AUTO: 242 K/UL — SIGNIFICANT CHANGE UP (ref 150–400)
POTASSIUM SERPL-MCNC: 4 MMOL/L — SIGNIFICANT CHANGE UP (ref 3.5–5.3)
POTASSIUM SERPL-SCNC: 4 MMOL/L — SIGNIFICANT CHANGE UP (ref 3.5–5.3)
PROT SERPL-MCNC: 6.3 G/DL — LOW (ref 6.6–8.7)
RBC # BLD: 4.99 M/UL — SIGNIFICANT CHANGE UP (ref 4.2–5.8)
RBC # FLD: 13.8 % — SIGNIFICANT CHANGE UP (ref 10.3–14.5)
SODIUM SERPL-SCNC: 139 MMOL/L — SIGNIFICANT CHANGE UP (ref 135–145)
WBC # BLD: 9.92 K/UL — SIGNIFICANT CHANGE UP (ref 3.8–10.5)
WBC # FLD AUTO: 9.92 K/UL — SIGNIFICANT CHANGE UP (ref 3.8–10.5)

## 2021-10-24 PROCEDURE — C1753: CPT

## 2021-10-24 PROCEDURE — 99239 HOSP IP/OBS DSCHRG MGMT >30: CPT

## 2021-10-24 PROCEDURE — 86850 RBC ANTIBODY SCREEN: CPT

## 2021-10-24 PROCEDURE — C1874: CPT

## 2021-10-24 PROCEDURE — 80307 DRUG TEST PRSMV CHEM ANLYZR: CPT

## 2021-10-24 PROCEDURE — 84443 ASSAY THYROID STIM HORMONE: CPT

## 2021-10-24 PROCEDURE — 71045 X-RAY EXAM CHEST 1 VIEW: CPT

## 2021-10-24 PROCEDURE — 93306 TTE W/DOPPLER COMPLETE: CPT | Mod: 26

## 2021-10-24 PROCEDURE — 83690 ASSAY OF LIPASE: CPT

## 2021-10-24 PROCEDURE — 83880 ASSAY OF NATRIURETIC PEPTIDE: CPT

## 2021-10-24 PROCEDURE — 93005 ELECTROCARDIOGRAM TRACING: CPT

## 2021-10-24 PROCEDURE — 99285 EMERGENCY DEPT VISIT HI MDM: CPT

## 2021-10-24 PROCEDURE — C9606: CPT | Mod: LD

## 2021-10-24 PROCEDURE — 93458 L HRT ARTERY/VENTRICLE ANGIO: CPT | Mod: 59

## 2021-10-24 PROCEDURE — C1725: CPT

## 2021-10-24 PROCEDURE — C1757: CPT

## 2021-10-24 PROCEDURE — C1887: CPT

## 2021-10-24 PROCEDURE — 92978 ENDOLUMINL IVUS OCT C 1ST: CPT | Mod: LD

## 2021-10-24 PROCEDURE — C1894: CPT

## 2021-10-24 PROCEDURE — 80061 LIPID PANEL: CPT

## 2021-10-24 PROCEDURE — 99232 SBSQ HOSP IP/OBS MODERATE 35: CPT

## 2021-10-24 PROCEDURE — 99153 MOD SED SAME PHYS/QHP EA: CPT

## 2021-10-24 PROCEDURE — 83036 HEMOGLOBIN GLYCOSYLATED A1C: CPT

## 2021-10-24 PROCEDURE — 86901 BLOOD TYPING SEROLOGIC RH(D): CPT

## 2021-10-24 PROCEDURE — C1769: CPT

## 2021-10-24 PROCEDURE — 86900 BLOOD TYPING SEROLOGIC ABO: CPT

## 2021-10-24 PROCEDURE — 81001 URINALYSIS AUTO W/SCOPE: CPT

## 2021-10-24 PROCEDURE — 83735 ASSAY OF MAGNESIUM: CPT

## 2021-10-24 PROCEDURE — 85027 COMPLETE CBC AUTOMATED: CPT

## 2021-10-24 PROCEDURE — 84484 ASSAY OF TROPONIN QUANT: CPT

## 2021-10-24 PROCEDURE — 93306 TTE W/DOPPLER COMPLETE: CPT

## 2021-10-24 PROCEDURE — 80053 COMPREHEN METABOLIC PANEL: CPT

## 2021-10-24 PROCEDURE — 99152 MOD SED SAME PHYS/QHP 5/>YRS: CPT

## 2021-10-24 PROCEDURE — 85025 COMPLETE CBC W/AUTO DIFF WBC: CPT

## 2021-10-24 PROCEDURE — 87637 SARSCOV2&INF A&B&RSV AMP PRB: CPT

## 2021-10-24 PROCEDURE — 36415 COLL VENOUS BLD VENIPUNCTURE: CPT

## 2021-10-24 RX ORDER — LISINOPRIL 2.5 MG/1
1 TABLET ORAL
Qty: 30 | Refills: 0
Start: 2021-10-24 | End: 2021-11-22

## 2021-10-24 RX ORDER — ASPIRIN/CALCIUM CARB/MAGNESIUM 324 MG
1 TABLET ORAL
Qty: 30 | Refills: 0
Start: 2021-10-24 | End: 2021-11-22

## 2021-10-24 RX ORDER — ATORVASTATIN CALCIUM 80 MG/1
1 TABLET, FILM COATED ORAL
Qty: 30 | Refills: 0
Start: 2021-10-24 | End: 2021-11-22

## 2021-10-24 RX ORDER — METOPROLOL TARTRATE 50 MG
1 TABLET ORAL
Qty: 30 | Refills: 0
Start: 2021-10-24 | End: 2021-11-22

## 2021-10-24 RX ORDER — TICAGRELOR 90 MG/1
1 TABLET ORAL
Qty: 60 | Refills: 0
Start: 2021-10-24 | End: 2021-11-22

## 2021-10-24 RX ADMIN — TICAGRELOR 90 MILLIGRAM(S): 90 TABLET ORAL at 17:18

## 2021-10-24 RX ADMIN — Medication 1 TABLET(S): at 11:37

## 2021-10-24 RX ADMIN — CHLORHEXIDINE GLUCONATE 1 APPLICATION(S): 213 SOLUTION TOPICAL at 05:18

## 2021-10-24 RX ADMIN — ENOXAPARIN SODIUM 40 MILLIGRAM(S): 100 INJECTION SUBCUTANEOUS at 11:36

## 2021-10-24 RX ADMIN — Medication 81 MILLIGRAM(S): at 11:36

## 2021-10-24 RX ADMIN — TICAGRELOR 90 MILLIGRAM(S): 90 TABLET ORAL at 05:08

## 2021-10-24 RX ADMIN — LISINOPRIL 2.5 MILLIGRAM(S): 2.5 TABLET ORAL at 11:37

## 2021-10-24 RX ADMIN — Medication 50 MILLIGRAM(S): at 06:57

## 2021-10-24 NOTE — DISCHARGE NOTE PROVIDER - NSDCFUADDAPPT_GEN_ALL_CORE_FT
Follow up with PMD in 1 week.  Follow up with Cardio in 1 week.     Repeat BMP in 1 week.  Thrombophilia work up as an outpatient.   Sleep study as an outpatient for suspected sleep apnea.

## 2021-10-24 NOTE — DISCHARGE NOTE PROVIDER - NSDCCPCAREPLAN_GEN_ALL_CORE_FT
PRINCIPAL DISCHARGE DIAGNOSIS  Diagnosis: STEMI (ST elevation myocardial infarction)  Assessment and Plan of Treatment: s/p cardiac cath with PCI.  Continue medications as prescribed.  Follow up with Cardio in 1 week.

## 2021-10-24 NOTE — PROGRESS NOTE ADULT - SUBJECTIVE AND OBJECTIVE BOX
[facility]                                                               Office: [address] [address]                                                              Telephone: [phone]. Fax:[phone]                                                                             PROGRESS NOTE  Reason for follow up: STEMI of the LAD   Overnight: No new events.   Update:   Patient notes that hes feeling well no lightheadedness or dizziness and wants to go home   No chest pain or shortness of breath   Subjective: "  ______________________"      	  Vitals:  T(C): 36.9 (10-24-21 @ 11:35), Max: 37.7 (10-23-21 @ 16:20)  HR: 72 (10-24-21 @ 11:35) (72 - 92)  BP: 110/58 (10-24-21 @ 11:35) (102/61 - 132/71)  RR: 14 (10-24-21 @ 11:35) (14 - 18)  SpO2: 90% (10-24-21 @ 11:35) (90% - 98%)  Wt(kg): --  I&O's Summary    Weight (kg): 129 (10-22 @ 19:32)      PHYSICAL EXAM:  Appearance: Comfortable. No acute distress  HEENT:  Head and neck: Atraumatic. Normocephalic.  Normal oral mucosa, PERRL, Neck is supple. No JVD, No carotid bruit.   Neurologic: A & O x 3, no focal deficits. EOMI.  Lymphatic: No cervical lymphadenopathy  Cardiovascular: Normal S1 S2, No murmur, rubs/gallops. No JVD, No edema  Respiratory: Lungs clear to auscultation  Gastrointestinal:  Soft, Non-tender, + BS  Lower Extremities: No edema  Psychiatry: Patient is calm. No agitation. Mood & affect appropriate  Skin: No rashes/ ecchymoses/cyanosis/ulcers visualized on the face, hands or feet.      CURRENT MEDICATIONS:  lisinopril 2.5 milliGRAM(s) Oral daily  metoprolol succinate ER 50 milliGRAM(s) Oral daily    atorvastatin  aspirin enteric coated  chlorhexidine 4% Liquid  enoxaparin Injectable  influenza   Vaccine  multivitamin  ticagrelor      DIAGNOSTIC TESTING:  [ x] Echocardiogram:   < from: TTE Echo Complete w/o Contrast w/ Doppler (10.24.21 @ 12:19) >    Summary:   1. Left ventricular ejection fraction, by visual estimation, is 60 to 65%.   2. Normal global left ventricular systolic function.   3. There is mild septal left ventricular hypertrophy.   4. Normal right ventricular size and function.   5. No evidence of mitral valve regurgitation.   6. There is no evidence of pericardial effusion.   7. There are no prior studies on this patient for comparison purposes.    Aubrie Hoff D.O. Electronically signed on 10/24/2021 at 2:04:18 PM    < end of copied text >    [ x]  Catheterization:    [ ] Stress Test:    OTHER: 	      LABS:	 	  CARDIAC MARKERS ( 22 Oct 2021 04:22 )  x     / 0.37 ng/mL / x     / x     / x      p-BNP 22 Oct 2021 04:22: x    , CARDIAC MARKERS ( 21 Oct 2021 23:18 )  x     / <0.01 ng/mL / x     / x     / x      p-BNP 21 Oct 2021 23:18: 88 pg/mL                          13.1   9.92  )-----------( 242      ( 24 Oct 2021 08:02 )             41.0     10-24    139  |  103  |  12.5  ----------------------------<  100<H>  4.0   |  22.0  |  1.04    Ca    8.7      24 Oct 2021 08:02  Mg     2.0     10-23    TPro  6.3<L>  /  Alb  4.1  /  TBili  0.3<L>  /  DBili  x   /  AST  71<H>  /  ALT  47<H>  /  AlkPhos  53  10-24    proBNP: Serum Pro-Brain Natriuretic Peptide: 88 pg/mL (10-21 @ 23:18)    Lipid Profile: Date: 10-22 @ 04:22  Total cholesterol 181; Direct LDL: --; HDL: 43; Triglycerides:162    HgA1c:   TSH: Thyroid Stimulating Hormone, Serum: 1.11 uIU/mL        TELEMETRY: Reviewed  Bradycardia   ECG:  Reviewed by me.
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
## 2021-10-24 NOTE — DISCHARGE NOTE PROVIDER - NSDCMRMEDTOKEN_GEN_ALL_CORE_FT
aspirin 81 mg oral delayed release tablet: 1 tab(s) orally once a day  atorvastatin 40 mg oral tablet: 1 tab(s) orally once a day (at bedtime)  ergocalciferol 1.25 mg (50,000 intl units) oral capsule: 1 cap(s) orally once a week  lisinopril 2.5 mg oral tablet: 1 tab(s) orally once a day  Metoprolol Succinate ER 25 mg oral tablet, extended release: 1 tab(s) orally once a day   Multiple Vitamins oral tablet: 1 tab(s) orally once a day  ticagrelor 90 mg oral tablet: 1 tab(s) orally every 12 hours
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
## 2021-10-24 NOTE — DISCHARGE NOTE NURSING/CASE MANAGEMENT/SOCIAL WORK - PATIENT PORTAL LINK FT
You can access the FollowMyHealth Patient Portal offered by [facility] by registering at the following website: [email or web address]. By joining [employer]’s FollowMyHealth portal, you will also be able to view your health information using other applications (apps) compatible with our system.

## 2021-10-25 PROBLEM — F12.90 CANNABIS USE, UNSPECIFIED, UNCOMPLICATED: Chronic | Status: ACTIVE | Noted: 2021-10-22

## 2021-11-01 ENCOUNTER — APPOINTMENT (OUTPATIENT)
Dept: CARDIOLOGY | Facility: CLINIC | Age: 31
End: 2021-11-01
Payer: MEDICAID

## 2021-11-01 ENCOUNTER — NON-APPOINTMENT (OUTPATIENT)
Age: 31
End: 2021-11-01

## 2021-11-01 VITALS — SYSTOLIC BLOOD PRESSURE: 98 MMHG | DIASTOLIC BLOOD PRESSURE: 62 MMHG

## 2021-11-01 VITALS
SYSTOLIC BLOOD PRESSURE: 98 MMHG | HEIGHT: 78 IN | TEMPERATURE: 99 F | HEART RATE: 80 BPM | DIASTOLIC BLOOD PRESSURE: 60 MMHG | OXYGEN SATURATION: 97 % | BODY MASS INDEX: 32.74 KG/M2 | WEIGHT: 283 LBS

## 2021-11-01 DIAGNOSIS — Z87.891 PERSONAL HISTORY OF NICOTINE DEPENDENCE: ICD-10-CM

## 2021-11-01 DIAGNOSIS — E55.9 VITAMIN D DEFICIENCY, UNSPECIFIED: ICD-10-CM

## 2021-11-01 DIAGNOSIS — Z78.9 OTHER SPECIFIED HEALTH STATUS: ICD-10-CM

## 2021-11-01 DIAGNOSIS — Z87.820 PERSONAL HISTORY OF TRAUMATIC BRAIN INJURY: ICD-10-CM

## 2021-11-01 DIAGNOSIS — U07.1 COVID-19: ICD-10-CM

## 2021-11-01 PROCEDURE — 93000 ELECTROCARDIOGRAM COMPLETE: CPT

## 2021-11-01 PROCEDURE — 99213 OFFICE O/P EST LOW 20 MIN: CPT

## 2021-11-07 NOTE — ASSESSMENT
[FreeTextEntry1] : 32 yo M STEMI/CAD (involving the LAD on ASA and Brilinta), Dyslipidemia presents for post hospital follow up \par \par \par 1. CAD s/p STEMI of the LAD (on ASA and Brilinta)\par - patient notes no shortness of breath which started after being on Brilinta \par - EKG reviewed and shows 11/1/2021: Sinus Rhythm @ 70 bpm Low voltage in limb leads. -Right axis for age. -Decreasing R-wave progression -may be secondary to pulmonary disease. ABNORMAL \par - on Toprol XL Lisinopril 2.5mg , ASA, Brilinta and Lipitor \par \par 2. Dyslipidemia \par - \par - on Lipitor 80mg \par \par Plan: \par - discussed transition from Brilinta to Plavix, so discussed patient to take 8 tablet of Plavix and then transition to Plavix 75 mg po qdaily \par - continue with ASA and Lipitor 80mg po qdaily \par - discussed lifestyle and dietary modification \par - patient notes that he will start exercising discussed cardio and no weight lifting at this time \par - follow up in 3 month s\par \par \par Aubrie Hoff D.O. FACC\par Cardiology\par

## 2021-11-07 NOTE — CARDIOLOGY SUMMARY
[de-identified] : 11/1/2021: Sinus  Rhythm @ 70 bpm  Low voltage in limb leads.   -Right axis for age.   -Decreasing R-wave progression -may be secondary to pulmonary disease.  ABNORMAL  [de-identified] : CORONARY VESSELS: The coronary circulation is right dominant.\par  \par LM:   --  LM: Normal.\par  \par LAD:   --  LAD: The vessel was very large sized.\par --  Proximal LAD: There was a tubular 70 % stenosis in the middle third of\par the vessel segment. The lesion was irregularly contoured and hazy. IVUS\par assessment showed ulcerated plaque with overlying thrombus.\par --  Distal LAD: There was a tubular 100 % stenosis in the distal third of\par the vessel segment. There was AIDAN grade 0 flow through the vessel (no\par flow) and a moderate-sized vascular territory distal to the lesion.\par  \par CX:   --  Circumflex: The vessel was large sized. Angiography showed no\par evidence of disease.\par  \par RCA:   --  RCA: The vessel was large sized (dominant). Angiography showed\par minor luminal irregularities with no flow limiting lesions.\par  \par COMPLICATIONS: No complications occurred during the cath lab visit.\par DIAGNOSTIC IMPRESSIONS: Anterior STEMI with proximal LAD ulcerated plaque\par and overlying thrombus causing 70% stenosis and distal % stenosis\par  \par INTERVENTIONAL IMPRESSIONS: s/p primary PCI to proximal LAD usinga 5.0 x 15\par mm stent\par s/p balloon angioplasty to distal LAD using 2.0 balloon with distal\par injection of nitroglycerin and cardene\par  \par INTERVENTIONAL RECOMMENDATIONS: continue ASA 81 mg , Ticagrelor 180 mg now\par then 90 mg twice daily\par Integrillin for 10 hours\par STatin to lower LDL < 70 mg/dl\par Admit to CCU\par Echo tomorrow to access LV function\par Cardiac rehabilitation referral\par info provided to enroll in cardiac rehab\par Cardiac rehabilitation center communicated with regarding the patient.\par  \par Prepared and signed by\par  [de-identified] : Summary:\par  1. Left ventricular ejection fraction, by visual estimation, is 60 to 65%.\par  2. Normal global left ventricular systolic function.\par  3. There is mild septal left ventricular hypertrophy.\par  4. Normal right ventricular size and function.\par  5. No evidence of mitral valve regurgitation.\par  6. There is no evidence of pericardial effusion.\par  7. There are no prior studies on this patient for comparison purposes.\par \par Aubrie Hoff D.O. Electronically signed on 10/24/2021 at 2:04:18 PM

## 2021-11-07 NOTE — HISTORY OF PRESENT ILLNESS
[FreeTextEntry1] : 32 yo M STEMI/CAD  (involving the LAD on ASA and Brilinta), Dyslipidemia presents for post hospital follow up \par \par Patient notes that he has been doing well with some associated shortness of breath but patient notes never had this prior to hospitalization but notes this occurs all day long, currently on Brilinta. \par Patient states that he occasionally has twinging chest pain but nothing like what he had when he initially presented to the hospital with his STEMI. No lower extremity edema.\par Discussed at length about diet and exercise (discussed starting with cardio rather than weight lifting). \par Complaint with medications\par Changes diet and has stopped smoking. \par \par Family Hx: Sister has CAD and need PCI \par \par \par Sister: HTN and needs PCI \par PMD: Dr. Catie hector [place]\par [email or web address]\par \par \par + shortness of breath\par CAD s/p STEMI of the LAD\par - no active chest pain or shortness of breath\par - overnight on telemetry patient was bradycardia in the 30s and even during\par echocardiogram 30 bpm\par - Toprol XL was increased due to HR in 90's and had to be decreased for reasons\par above\par - on Asa Brilinta and Lipitor\par - c/w lisinopril 2.5 q daily\par - should consider thrombophilia work up in the outpatient setting due to\par patient having acute arterial thrombosis and no known family hx of CAD (\par MTHFR/Prothombin gene mutation/ Factor V Leidin/ LAC (Lupus anticoagulant)\par - patient will require sleep study in the outpatient setting\par - Extensive counseling regarding diet taking meds arcadio dual antiplatelets\par - Low fat low cholesterol diet discussed\par - TTE reviewed and normal LV function with no evidence of valvulopathy\par \par Patient to follow up with Dr. Zarate in 1 week\par

## 2021-11-30 RX ORDER — TICAGRELOR 90 MG/1
90 TABLET ORAL
Qty: 90 | Refills: 0 | Status: DISCONTINUED | COMMUNITY
Start: 2021-11-01 | End: 2021-11-30

## 2021-12-13 ENCOUNTER — APPOINTMENT (OUTPATIENT)
Dept: CARDIOLOGY | Facility: CLINIC | Age: 31
End: 2021-12-13
Payer: MEDICAID

## 2021-12-13 VITALS
BODY MASS INDEX: 40.18 KG/M2 | DIASTOLIC BLOOD PRESSURE: 60 MMHG | SYSTOLIC BLOOD PRESSURE: 98 MMHG | TEMPERATURE: 98.3 F | HEIGHT: 71 IN | WEIGHT: 287 LBS | OXYGEN SATURATION: 96 % | HEART RATE: 83 BPM

## 2021-12-13 DIAGNOSIS — Z00.00 ENCOUNTER FOR GENERAL ADULT MEDICAL EXAMINATION W/OUT ABNORMAL FINDINGS: ICD-10-CM

## 2021-12-13 PROCEDURE — 99215 OFFICE O/P EST HI 40 MIN: CPT

## 2021-12-13 PROCEDURE — 93000 ELECTROCARDIOGRAM COMPLETE: CPT

## 2021-12-14 ENCOUNTER — NON-APPOINTMENT (OUTPATIENT)
Age: 31
End: 2021-12-14

## 2021-12-14 NOTE — REASON FOR VISIT
[Symptom and Test Evaluation] : symptom and test evaluation [Coronary Artery Disease] : coronary artery disease [FreeTextEntry1] : 30 yo M STEMI/CAD (involving the LAD on ASA and Plavix), Dyslipidemia presents for post hospital follow up \par \par Patient notes that since the last visit he has been feeling fatigue after walking 2.5 miles with associated shortness of breath, he notes that the fatigue is more noticeable and that occasionally he feels chest pain or discomfort, described as sharp and pinching in nature with no radiation of the pain, he also notes that he gets chest symptoms when picking up heavy stuff. He notes no rest pain, lower extremity edema, orthopnea or PND. \par Notes interval improvement improvement in shortness of breath after switching of Brilinta to Plavix. \par \par \par Family Hx: Sister has CAD and need PCI \par \par \par Sister: HTN and needs PCI \par PMD: Dr. Catie hector [place]\par [email or web address]\par \par

## 2021-12-14 NOTE — CARDIOLOGY SUMMARY
[de-identified] : 12/2021: Sinus  Rhythm @ 68 bpm, Low voltage in limb leads. Right axis for age.  Nonspecific T-abnormality. \par 11/1/2021: Sinus Rhythm @ 70 bpm Low voltage in limb leads. -Right axis for age. -Decreasing R-wave progression -may be secondary to pulmonary disease. ABNORMAL  [de-identified] : 10/24/2021: TTE: Summary:\par  1. Left ventricular ejection fraction, by visual estimation, is 60 to 65%.\par  2. Normal global left ventricular systolic function.\par  3. There is mild septal left ventricular hypertrophy.\par  4. Normal right ventricular size and function.\par  5. No evidence of mitral valve regurgitation.\par  6. There is no evidence of pericardial effusion.\par  7. There are no prior studies on this patient for comparison purposes.\par  [de-identified] : CORONARY VESSELS: The coronary circulation is right dominant.\par  \par LM: -- LM: Normal.\par  \par LAD: -- LAD: The vessel was very large sized.\par -- Proximal LAD: There was a tubular 70 % stenosis in the middle third of\par the vessel segment. The lesion was irregularly contoured and hazy. IVUS\par assessment showed ulcerated plaque with overlying thrombus.\par -- Distal LAD: There was a tubular 100 % stenosis in the distal third of\par the vessel segment. There was AIDAN grade 0 flow through the vessel (no\par flow) and a moderate-sized vascular territory distal to the lesion.\par  \par CX: -- Circumflex: The vessel was large sized. Angiography showed no\par evidence of disease.\par  \par RCA: -- RCA: The vessel was large sized (dominant). Angiography showed\par minor luminal irregularities with no flow limiting lesions.\par  \par COMPLICATIONS: No complications occurred during the cath lab visit.\par DIAGNOSTIC IMPRESSIONS: Anterior STEMI with proximal LAD ulcerated plaque\par and overlying thrombus causing 70% stenosis and distal % stenosis\par  \par INTERVENTIONAL IMPRESSIONS: s/p primary PCI to proximal LAD usinga 5.0 x 15\par mm stent\par s/p balloon angioplasty to distal LAD using 2.0 balloon with distal\par injection of nitroglycerin and cardene\par  \par INTERVENTIONAL RECOMMENDATIONS: continue ASA 81 mg , Ticagrelor 180 mg now\par then 90 mg twice daily\par Integrillin for 10 hours\par STatin to lower LDL < 70 mg/dl\par Admit to CCU\par Echo tomorrow to access LV function\par Cardiac rehabilitation referral\par info provided to enroll in cardiac rehab\par Cardiac rehabilitation center communicated with regarding the patient.\par

## 2021-12-14 NOTE — ASSESSMENT
[FreeTextEntry1] : 30 yo M STEMI/CAD (involving the LAD on ASA and Plavix), Dyslipidemia presents for post hospital follow up \par \par 1. CAD / STEMI (involving the LAD on ASA and Plavix)\par - notes to have fatigue and atypical chest pain \par - EKG reviewed and compared no acute changes compared to prior \par - on ASA, Plavix and Lipitor \par \par 2. Dyslipidemia \par - on Lipitor 40mg \par \par Plan: \par - discussed with patient based on fatigue and atypical chest pain symptoms will obtain stress EKG to assess for any EKG changes with ambulation and reproduction of symptoms \par - EKG reviewed and shows no changes consistent with acute ischemia, NSR @ 68 bpm with low voltage in the limb leads with right axis \par - continue with ASA, Plavix and Lipitor \par - will refer to Cardiac rehab \par - will follow up in 2 months \par \par Aubrie Hoff D.O. FACMAYANK\par Cardiology\par \par \par

## 2022-01-13 ENCOUNTER — APPOINTMENT (OUTPATIENT)
Dept: CARDIOLOGY | Facility: CLINIC | Age: 32
End: 2022-01-13

## 2022-01-20 ENCOUNTER — APPOINTMENT (OUTPATIENT)
Dept: CARDIOLOGY | Facility: CLINIC | Age: 32
End: 2022-01-20
Payer: MEDICAID

## 2022-01-20 PROCEDURE — A9500: CPT

## 2022-01-20 PROCEDURE — 93015 CV STRESS TEST SUPVJ I&R: CPT

## 2022-01-20 PROCEDURE — 78452 HT MUSCLE IMAGE SPECT MULT: CPT

## 2022-02-01 ENCOUNTER — APPOINTMENT (OUTPATIENT)
Dept: CARDIOLOGY | Facility: CLINIC | Age: 32
End: 2022-02-01

## 2022-03-08 ENCOUNTER — APPOINTMENT (OUTPATIENT)
Dept: CARDIOLOGY | Facility: CLINIC | Age: 32
End: 2022-03-08
Payer: MEDICAID

## 2022-03-08 VITALS
SYSTOLIC BLOOD PRESSURE: 100 MMHG | HEIGHT: 71 IN | OXYGEN SATURATION: 97 % | WEIGHT: 278 LBS | TEMPERATURE: 98.3 F | DIASTOLIC BLOOD PRESSURE: 60 MMHG | HEART RATE: 81 BPM | BODY MASS INDEX: 38.92 KG/M2

## 2022-03-08 PROCEDURE — 93000 ELECTROCARDIOGRAM COMPLETE: CPT

## 2022-03-08 PROCEDURE — 99214 OFFICE O/P EST MOD 30 MIN: CPT

## 2022-03-14 ENCOUNTER — NON-APPOINTMENT (OUTPATIENT)
Age: 32
End: 2022-03-14

## 2022-03-14 NOTE — REASON FOR VISIT
[Symptom and Test Evaluation] : symptom and test evaluation [FreeTextEntry1] : 30 yo M STEMI/CAD (involving the LAD on ASA and Plavix), Dyslipidemia presents for post hospital follow up \par \par Patient notes that he has  been doing better but notes that occasionally he does feel chest discomfort with some shortness of breath and notes that he has since decreased smoking cigars, he notes that the fatigue is more noticeable and that occasionally he feels chest pain or discomfort, described as sharp and pinching in nature with no radiation of the pain, he also notes that he gets chest symptoms when picking up heavy stuff. He notes no rest pain, lower extremity edema, orthopnea or PND. \par Tolerating medications although at times he forgets meds or he takes at different times \par \par Discussed his stress testing with interventionalist and notes that in the distal LAD there was difficulty in wiring and further opening and thus correlate with the stress test findings of moderate or medium sized infarct at the apex and distal wall with mild nayana-infarct ischemia. \par \par \par Family Hx: Sister has CAD and need PCI \par \par \par Sister: HTN and needs PCI \par PMD: Dr. Catie hector [place]\par [email or web address]

## 2022-03-14 NOTE — CARDIOLOGY SUMMARY
[de-identified] : 3/8/2022: Sinus  Bradycardia@ 53 bpm Low voltage in limb leads.  -Right axis for age. \par 12/2021: Sinus Rhythm @ 68 bpm, Low voltage in limb leads. Right axis for age. Nonspecific T-abnormality. \par 11/1/2021: Sinus Rhythm @ 70 bpm Low voltage in limb leads. -Right axis for age. -Decreasing R-wave progression -may be secondary to pulmonary disease. ABNORMAL \par  [de-identified] : 1/21/2022: NM Myocardial \par Abnormal pharmacologic stress test \par Medium sized infarct at the apex and distal anterior wall with mild nayana-infarct ischemia \par LVEF 59% with severe hypokinesis of the apex/distal anterior wall \par The overall study imaging quality was normal  [de-identified] : 10/24/2021: TTE: Summary:\par  1. Left ventricular ejection fraction, by visual estimation, is 60 to 65%.\par  2. Normal global left ventricular systolic function.\par  3. There is mild septal left ventricular hypertrophy.\par  4. Normal right ventricular size and function.\par  5. No evidence of mitral valve regurgitation.\par  6. There is no evidence of pericardial effusion.\par  7. There are no prior studies on this patient for comparison purposes.\par  \par  [de-identified] :  CORONARY VESSELS: The coronary circulation is right dominant.\par  LM: -- LM: Normal.\par  LAD: -- LAD: The vessel was very large sized.\par -- Proximal LAD: There was a tubular 70 % stenosis in the middle third of\par the vessel segment. The lesion was irregularly contoured and hazy. IVUS\par assessment showed ulcerated plaque with overlying thrombus.\par -- Distal LAD: There was a tubular 100 % stenosis in the distal third of\par the vessel segment. There was AIDAN grade 0 flow through the vessel (no\par flow) and a moderate-sized vascular territory distal to the lesion.\par  \par CX: -- Circumflex: The vessel was large sized. Angiography showed no\par evidence of disease.\par  \par RCA: -- RCA: The vessel was large sized (dominant). Angiography showed\par minor luminal irregularities with no flow limiting lesions.\par  \par COMPLICATIONS: No complications occurred during the cath lab visit.\par DIAGNOSTIC IMPRESSIONS: Anterior STEMI with proximal LAD ulcerated plaque\par and overlying thrombus causing 70% stenosis and distal % stenosis\par  \par INTERVENTIONAL IMPRESSIONS: s/p primary PCI to proximal LAD usinga 5.0 x 15\par mm stent\par s/p balloon angioplasty to distal LAD using 2.0 balloon with distal\par injection of nitroglycerin and cardene\par  \par INTERVENTIONAL RECOMMENDATIONS: continue ASA 81 mg , Ticagrelor 180 mg now\par then 90 mg twice daily\par Integrillin for 10 hours\par STatin to lower LDL < 70 mg/dl\par Admit to CCU\par Echo tomorrow to access LV function\par Cardiac rehabilitation referral\par info provided to enroll in cardiac rehab\par Cardiac rehabilitation center communicated with regarding the patient.\par

## 2022-03-14 NOTE — ASSESSMENT
[FreeTextEntry1] : 32 yo M STEMI/CAD (involving the LAD on ASA and Plavix), Dyslipidemia presents for post hospital follow up \par \par 1. CAD / STEMI (involving the LAD on ASA and Plavix)\par - notes to have fatigue and atypical chest pain \par - EKG reviewed and compared no acute changes compared to prior \par - on ASA, Plavix and Lipitor \par - NM Myocardial: Medium sized infarct at the apex and distal anterior wall with mild nayana-infarct ischemia \par LVEF 59% with severe hypokinesis of the apex/distal anterior wall \par - discussed finding with interventionalist and he notes in the distal LAD there was difficulty canalizing the distal LAD which supplies the apex and noted this is closed and thus correlates with the stress test and notes will have to continue with medical management  \par - referred to cardiac rehab \par - unable to maximize BB due to low normal blood pressure next visit may consider Imdur 30mg po q daily \par \par 2. Dyslipidemia \par - on Lipitor 40mg \par \par \par Aubrie Hoff D.O. FACC\par Cardiology\par

## 2022-08-06 RX ORDER — MULTIVITAMIN
TABLET ORAL
Qty: 90 | Refills: 2 | Status: ACTIVE | COMMUNITY
Start: 2021-11-01 | End: 1900-01-01

## 2022-08-16 ENCOUNTER — APPOINTMENT (OUTPATIENT)
Dept: CARDIOLOGY | Facility: CLINIC | Age: 32
End: 2022-08-16

## 2022-08-16 ENCOUNTER — NON-APPOINTMENT (OUTPATIENT)
Age: 32
End: 2022-08-16

## 2022-08-16 VITALS
DIASTOLIC BLOOD PRESSURE: 68 MMHG | BODY MASS INDEX: 37.66 KG/M2 | OXYGEN SATURATION: 98 % | SYSTOLIC BLOOD PRESSURE: 106 MMHG | HEIGHT: 71 IN | WEIGHT: 269 LBS | HEART RATE: 69 BPM | TEMPERATURE: 98.4 F

## 2022-08-16 DIAGNOSIS — R07.89 OTHER CHEST PAIN: ICD-10-CM

## 2022-08-16 DIAGNOSIS — I21.3 ST ELEVATION (STEMI) MYOCARDIAL INFARCTION OF UNSPECIFIED SITE: ICD-10-CM

## 2022-08-16 PROCEDURE — 99214 OFFICE O/P EST MOD 30 MIN: CPT | Mod: 25

## 2022-08-16 PROCEDURE — 93000 ELECTROCARDIOGRAM COMPLETE: CPT

## 2023-11-02 ENCOUNTER — NON-APPOINTMENT (OUTPATIENT)
Age: 33
End: 2023-11-02

## 2023-11-02 ENCOUNTER — APPOINTMENT (OUTPATIENT)
Dept: CARDIOLOGY | Facility: CLINIC | Age: 33
End: 2023-11-02
Payer: MEDICAID

## 2023-11-02 VITALS
WEIGHT: 290 LBS | HEIGHT: 71 IN | DIASTOLIC BLOOD PRESSURE: 52 MMHG | BODY MASS INDEX: 40.6 KG/M2 | SYSTOLIC BLOOD PRESSURE: 90 MMHG | HEART RATE: 83 BPM | OXYGEN SATURATION: 96 %

## 2023-11-02 VITALS — SYSTOLIC BLOOD PRESSURE: 94 MMHG | DIASTOLIC BLOOD PRESSURE: 60 MMHG

## 2023-11-02 DIAGNOSIS — R53.83 OTHER FATIGUE: ICD-10-CM

## 2023-11-02 PROCEDURE — 99215 OFFICE O/P EST HI 40 MIN: CPT | Mod: 25

## 2023-11-02 PROCEDURE — 93000 ELECTROCARDIOGRAM COMPLETE: CPT

## 2023-11-02 RX ORDER — ERGOCALCIFEROL 1.25 MG/1
1.25 MG CAPSULE, LIQUID FILLED ORAL
Qty: 12 | Refills: 1 | Status: DISCONTINUED | COMMUNITY
Start: 2021-11-01 | End: 2023-11-02

## 2023-11-02 RX ORDER — BLOOD PRESSURE TEST KIT-LARGE
KIT MISCELLANEOUS
Qty: 1 | Refills: 0 | Status: ACTIVE | COMMUNITY
Start: 2023-11-02 | End: 1900-01-01

## 2023-11-17 ENCOUNTER — APPOINTMENT (OUTPATIENT)
Dept: PULMONOLOGY | Facility: CLINIC | Age: 33
End: 2023-11-17
Payer: MEDICAID

## 2023-11-17 VITALS
SYSTOLIC BLOOD PRESSURE: 120 MMHG | OXYGEN SATURATION: 97 % | DIASTOLIC BLOOD PRESSURE: 72 MMHG | HEART RATE: 76 BPM | TEMPERATURE: 96.2 F | WEIGHT: 288 LBS | BODY MASS INDEX: 40.32 KG/M2 | HEIGHT: 71 IN

## 2023-11-17 DIAGNOSIS — R06.83 SNORING: ICD-10-CM

## 2023-11-17 DIAGNOSIS — G47.10 HYPERSOMNIA, UNSPECIFIED: ICD-10-CM

## 2023-11-17 DIAGNOSIS — F17.200 NICOTINE DEPENDENCE, UNSPECIFIED, UNCOMPLICATED: ICD-10-CM

## 2023-11-17 PROCEDURE — 94010 BREATHING CAPACITY TEST: CPT

## 2023-11-17 PROCEDURE — 94727 GAS DIL/WSHOT DETER LNG VOL: CPT

## 2023-11-17 PROCEDURE — 94729 DIFFUSING CAPACITY: CPT

## 2023-11-17 PROCEDURE — 99204 OFFICE O/P NEW MOD 45 MIN: CPT | Mod: 25

## 2023-12-13 ENCOUNTER — APPOINTMENT (OUTPATIENT)
Dept: PULMONOLOGY | Facility: CLINIC | Age: 33
End: 2023-12-13
Payer: MEDICAID

## 2023-12-13 VITALS
WEIGHT: 290 LBS | SYSTOLIC BLOOD PRESSURE: 122 MMHG | OXYGEN SATURATION: 97 % | HEART RATE: 86 BPM | HEIGHT: 71 IN | BODY MASS INDEX: 40.6 KG/M2 | RESPIRATION RATE: 16 BRPM | DIASTOLIC BLOOD PRESSURE: 74 MMHG

## 2023-12-13 DIAGNOSIS — E66.01 MORBID (SEVERE) OBESITY DUE TO EXCESS CALORIES: ICD-10-CM

## 2023-12-13 DIAGNOSIS — G47.21 CIRCADIAN RHYTHM SLEEP DISORDER, DELAYED SLEEP PHASE TYPE: ICD-10-CM

## 2023-12-13 PROCEDURE — 99203 OFFICE O/P NEW LOW 30 MIN: CPT

## 2023-12-13 NOTE — CONSULT LETTER
[Dear  ___] : Dear  [unfilled], [Consult Letter:] : I had the pleasure of evaluating your patient, [unfilled]. [Please see my note below.] : Please see my note below. [Consult Closing:] : Thank you very much for allowing me to participate in the care of this patient.  If you have any questions, please do not hesitate to contact me. [Sincerely,] : Sincerely, [DrAlysha  ___] : Dr. DELGADILLO

## 2023-12-13 NOTE — HISTORY OF PRESENT ILLNESS
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   Smoking Status: current   eCigarettes: never   Marijuana use: everyday, current   Other: Patient smokes 3-4 cigarettes a week  Can't sleep well at home - worries about two young sons Difficulty falling asleep at night - works nights.   Supposed to have been seen at the [facility] by Dr. Hope on 12/5/23, in order to be scheduled for a PSG at their lab but he missed that appointment.  I think he may have believed his appointment here was for that purpose.  [ESS] : 8

## 2023-12-13 NOTE — RESULTS/DATA
[TextEntry] : Pulmonary function test 11/17/2023 revealed mild obstructive airways disease. Severe obstruction small airways

## 2023-12-13 NOTE — DISCUSSION/SUMMARY
[FreeTextEntry1] : Assess  Obesity KANIKA  Plan  Weight loss PSG at [facility] (after rescheduling with Dr Hope) or at [facility] on my order depending on patient  preference.  6 week FU with Dr Pate

## 2023-12-13 NOTE — PHYSICAL EXAM
[No Acute Distress] : no acute distress [Elongated Uvula] : elongated uvula [Enlarged Base of the Tongue] : enlarged base of the tongue [III] : Mallampati Class: III [Normal Appearance] : normal appearance [Neck Circumference: ___] : neck circumference: [unfilled] [No Neck Mass] : no neck mass [Normal Rate/Rhythm] : normal rate/rhythm [Normal S1, S2] : normal s1, s2 [No Resp Distress] : no resp distress [Clear to Auscultation Bilaterally] : clear to auscultation bilaterally [No Clubbing] : no clubbing [No Edema] : no edema

## 2023-12-26 ENCOUNTER — APPOINTMENT (OUTPATIENT)
Dept: PULMONOLOGY | Facility: CLINIC | Age: 33
End: 2023-12-26

## 2024-02-11 ENCOUNTER — OUTPATIENT (OUTPATIENT)
Dept: OUTPATIENT SERVICES | Facility: HOSPITAL | Age: 34
LOS: 1 days | End: 2024-02-11
Payer: COMMERCIAL

## 2024-02-11 DIAGNOSIS — G47.33 OBSTRUCTIVE SLEEP APNEA (ADULT) (PEDIATRIC): ICD-10-CM

## 2024-02-11 PROCEDURE — 95810 POLYSOM 6/> YRS 4/> PARAM: CPT | Mod: 26

## 2024-02-11 PROCEDURE — 95811 POLYSOM 6/>YRS CPAP 4/> PARM: CPT

## 2024-03-12 ENCOUNTER — APPOINTMENT (OUTPATIENT)
Dept: CARDIOLOGY | Facility: CLINIC | Age: 34
End: 2024-03-12

## 2024-04-24 ENCOUNTER — RX RENEWAL (OUTPATIENT)
Age: 34
End: 2024-04-24

## 2024-05-10 ENCOUNTER — APPOINTMENT (OUTPATIENT)
Dept: CARDIOLOGY | Facility: CLINIC | Age: 34
End: 2024-05-10
Payer: MEDICAID

## 2024-05-10 ENCOUNTER — NON-APPOINTMENT (OUTPATIENT)
Age: 34
End: 2024-05-10

## 2024-05-10 VITALS
HEART RATE: 71 BPM | HEIGHT: 71 IN | SYSTOLIC BLOOD PRESSURE: 132 MMHG | OXYGEN SATURATION: 95 % | DIASTOLIC BLOOD PRESSURE: 78 MMHG | BODY MASS INDEX: 39.2 KG/M2 | WEIGHT: 280 LBS

## 2024-05-10 DIAGNOSIS — G47.33 OBSTRUCTIVE SLEEP APNEA (ADULT) (PEDIATRIC): ICD-10-CM

## 2024-05-10 DIAGNOSIS — E78.5 HYPERLIPIDEMIA, UNSPECIFIED: ICD-10-CM

## 2024-05-10 DIAGNOSIS — Z71.6 TOBACCO ABUSE COUNSELING: ICD-10-CM

## 2024-05-10 DIAGNOSIS — F17.200 NICOTINE DEPENDENCE, UNSPECIFIED, UNCOMPLICATED: ICD-10-CM

## 2024-05-10 DIAGNOSIS — Z95.5 PRESENCE OF CORONARY ANGIOPLASTY IMPLANT AND GRAFT: ICD-10-CM

## 2024-05-10 DIAGNOSIS — E66.9 OBESITY, UNSPECIFIED: ICD-10-CM

## 2024-05-10 DIAGNOSIS — I25.119 ATHEROSCLEROTIC HEART DISEASE OF NATIVE CORONARY ARTERY WITH UNSPECIFIED ANGINA PECTORIS: ICD-10-CM

## 2024-05-10 PROCEDURE — 93000 ELECTROCARDIOGRAM COMPLETE: CPT

## 2024-05-10 PROCEDURE — 99214 OFFICE O/P EST MOD 30 MIN: CPT | Mod: 25

## 2024-05-10 RX ORDER — METOPROLOL SUCCINATE 25 MG/1
25 TABLET, EXTENDED RELEASE ORAL
Qty: 90 | Refills: 0 | Status: DISCONTINUED | COMMUNITY
Start: 2021-11-01 | End: 2024-05-10

## 2024-05-10 RX ORDER — CLOPIDOGREL BISULFATE 75 MG/1
75 TABLET, FILM COATED ORAL
Qty: 90 | Refills: 3 | Status: ACTIVE | COMMUNITY
Start: 2021-11-01 | End: 1900-01-01

## 2024-05-10 RX ORDER — ATORVASTATIN CALCIUM 40 MG/1
40 TABLET, FILM COATED ORAL
Qty: 90 | Refills: 3 | Status: ACTIVE | COMMUNITY
Start: 2021-11-01 | End: 1900-01-01

## 2024-05-10 RX ORDER — ASPIRIN ENTERIC COATED TABLETS 81 MG 81 MG/1
81 TABLET, DELAYED RELEASE ORAL
Qty: 90 | Refills: 3 | Status: ACTIVE | COMMUNITY
Start: 2021-11-01 | End: 1900-01-01

## 2024-05-10 RX ORDER — LISINOPRIL 2.5 MG/1
2.5 TABLET ORAL
Qty: 90 | Refills: 0 | Status: DISCONTINUED | COMMUNITY
Start: 2021-11-01 | End: 2024-05-10

## 2024-07-29 ENCOUNTER — APPOINTMENT (OUTPATIENT)
Dept: CARDIOLOGY | Facility: CLINIC | Age: 34
End: 2024-07-29

## 2024-07-29 VITALS
BODY MASS INDEX: 39.48 KG/M2 | DIASTOLIC BLOOD PRESSURE: 60 MMHG | WEIGHT: 282 LBS | SYSTOLIC BLOOD PRESSURE: 110 MMHG | HEIGHT: 71 IN | OXYGEN SATURATION: 95 % | HEART RATE: 77 BPM

## 2024-07-29 PROCEDURE — 99214 OFFICE O/P EST MOD 30 MIN: CPT

## 2024-08-15 ENCOUNTER — APPOINTMENT (OUTPATIENT)
Dept: CARDIOLOGY | Facility: CLINIC | Age: 34
End: 2024-08-15

## 2025-02-06 ENCOUNTER — APPOINTMENT (OUTPATIENT)
Dept: CARDIOLOGY | Facility: CLINIC | Age: 35
End: 2025-02-06

## 2025-04-18 ENCOUNTER — APPOINTMENT (OUTPATIENT)
Dept: CARDIOLOGY | Facility: CLINIC | Age: 35
End: 2025-04-18

## 2025-07-02 ENCOUNTER — NON-APPOINTMENT (OUTPATIENT)
Age: 35
End: 2025-07-02

## 2025-07-03 ENCOUNTER — APPOINTMENT (OUTPATIENT)
Dept: CARDIOLOGY | Facility: CLINIC | Age: 35
End: 2025-07-03

## 2025-07-03 VITALS
SYSTOLIC BLOOD PRESSURE: 126 MMHG | OXYGEN SATURATION: 97 % | BODY MASS INDEX: 40.32 KG/M2 | DIASTOLIC BLOOD PRESSURE: 82 MMHG | WEIGHT: 288 LBS | HEIGHT: 71 IN | HEART RATE: 70 BPM

## 2025-07-03 PROBLEM — R07.89 CHEST DISCOMFORT: Status: ACTIVE | Noted: 2025-07-03

## 2025-07-03 PROCEDURE — 93000 ELECTROCARDIOGRAM COMPLETE: CPT

## 2025-07-03 PROCEDURE — 99214 OFFICE O/P EST MOD 30 MIN: CPT | Mod: 25

## 2025-08-07 ENCOUNTER — APPOINTMENT (OUTPATIENT)
Dept: CARDIOLOGY | Facility: CLINIC | Age: 35
End: 2025-08-07

## 2025-08-07 ENCOUNTER — APPOINTMENT (OUTPATIENT)
Dept: CARDIOLOGY | Facility: CLINIC | Age: 35
End: 2025-08-07
Payer: MEDICAID

## 2025-08-07 PROCEDURE — A9502: CPT

## 2025-08-07 PROCEDURE — 93015 CV STRESS TEST SUPVJ I&R: CPT

## 2025-08-07 PROCEDURE — 78452 HT MUSCLE IMAGE SPECT MULT: CPT

## 2025-08-13 ENCOUNTER — APPOINTMENT (OUTPATIENT)
Dept: CARDIOLOGY | Facility: CLINIC | Age: 35
End: 2025-08-13